# Patient Record
Sex: MALE | Race: WHITE | HISPANIC OR LATINO | Employment: UNEMPLOYED | URBAN - METROPOLITAN AREA
[De-identification: names, ages, dates, MRNs, and addresses within clinical notes are randomized per-mention and may not be internally consistent; named-entity substitution may affect disease eponyms.]

---

## 2018-02-11 ENCOUNTER — HOSPITAL ENCOUNTER (EMERGENCY)
Facility: MEDICAL CENTER | Age: 38
End: 2018-02-11
Attending: EMERGENCY MEDICINE
Payer: MEDICAID

## 2018-02-11 VITALS
RESPIRATION RATE: 15 BRPM | HEIGHT: 74 IN | OXYGEN SATURATION: 94 % | SYSTOLIC BLOOD PRESSURE: 120 MMHG | HEART RATE: 64 BPM | DIASTOLIC BLOOD PRESSURE: 62 MMHG | WEIGHT: 200 LBS | TEMPERATURE: 96.1 F | BODY MASS INDEX: 25.67 KG/M2

## 2018-02-11 DIAGNOSIS — H10.30 ACUTE BACTERIAL CONJUNCTIVITIS, UNSPECIFIED LATERALITY: ICD-10-CM

## 2018-02-11 PROCEDURE — 99283 EMERGENCY DEPT VISIT LOW MDM: CPT

## 2018-02-11 RX ORDER — OFLOXACIN 3 MG/ML
1 SOLUTION/ DROPS OPHTHALMIC 4 TIMES DAILY
Qty: 1 BOTTLE | Refills: 1 | Status: SHIPPED | OUTPATIENT
Start: 2018-02-11 | End: 2018-02-17

## 2018-02-11 ASSESSMENT — PAIN SCALES - GENERAL: PAINLEVEL_OUTOF10: 10

## 2018-02-11 NOTE — ED PROVIDER NOTES
"ED Provider Note    CHIEF COMPLAINT  Chief Complaint   Patient presents with   • Conjunctivitis       HPI  David Lewis is a 37 y.o. male who presents for evaluation of bilateral right greater than left eye redness and drainage. The patient wears contact lenses but she wears day and night he has not taken them out or clean them for over a year. He does not have an eye doctor. He reports significant decrease in vision in the right eye with pain and drainage. He has no significant medical or surgical history. No history of high surgery in the past. He does not have an ophthalmologist    REVIEW OF SYSTEMS  See HPI for further details. No high fevers chills night has weight loss numbness tingling or weakness All other systems are negative.     PAST MEDICAL HISTORY  No past medical history on file.  No stated medical history  FAMILY HISTORY  Noncontributory    SOCIAL HISTORY  Social History     Social History   • Marital status: Single     Spouse name: N/A   • Number of children: N/A   • Years of education: N/A     Social History Main Topics   • Smoking status: Never Smoker   • Smokeless tobacco: Not on file   • Alcohol use Yes   • Drug use: Yes     Types: Inhaled   • Sexual activity: Not on file     Other Topics Concern   • Not on file     Social History Narrative   • No narrative on file   Denies IV drugs     SURGICAL HISTORY  No past surgical history on file.  No major surgeries  CURRENT MEDICATIONS  No regular meds    ALLERGIES  No Known Allergies    PHYSICAL EXAM  VITAL SIGNS: /64   Pulse 62   Temp (!) 35.6 °C (96.1 °F) (Temporal)   Resp 14   Ht 1.88 m (6' 2\")   Wt 90.7 kg (200 lb)   SpO2 93%   BMI 25.68 kg/m²       Constitutional: Well developed, Well nourished, No acute distress, Non-toxic appearance.   HENT: Normocephalic, Atraumatic, Bilateral external ears normal, Oropharynx moist, No oral exudates, Nose normal.   Eyes: PERRLA, pupils are 3-2 bilaterally. The patient does have consensual pain " with shining a light in the left eye in the right eye. He has bilateral profound conjunctival hyperemia with exudates..   Neck: Normal range of motion, No tenderness, Supple, No stridor.   Cardiovascular: Normal heart rate, Normal rhythm, No murmurs, No rubs, No gallops.   Thorax & Lungs: Normal breath sounds, No respiratory distress, No wheezing, No chest tenderness.   Abdomen: Bowel sounds normal, Soft, No tenderness, No masses, No pulsatile masses.   Skin: Warm, Dry, No erythema, No rash.   Extremities: Intact distal pulses, No edema, No tenderness, No cyanosis, No clubbing.   Musculoskeletal: Good range of motion in all major joints. No tenderness to palpation or major deformities noted.   Neurologic: Alert & oriented x 3, Normal motor function, Normal sensory function, No focal deficits noted.   Psychiatric: Anxious.       COURSE & MEDICAL DECISION MAKING  Pertinent Labs & Imaging studies reviewed. (See chart for details)  Visual acuity right 20/200, left 20/40, both 20/30    Slit-lamp exam was performed. Proparacaine was instilled into both eyes Fluoresceine was instilled in both eyes. There is some subtle uptake on the right side suggestive of either abrasion or possible ulcer no dendritic lesions. Primo-Pen was used to check the pressure in the right eye was 4, 4 and 3, left eye was 2 and 3    Patient here has rather profound conjunctivitis. I had him remove his contact lens in the left eye he already removed it in the right. Apparently he had the same contact lenses in his eyes for over a year never took them out never changed him. The patient does have some visual acuity deficit but in light of his significant irritation should likely resolve. He'll need urgent follow-up and will be referred to ophthalmology with Dr. Street. I counseled him on not using any contact lenses while he is having symptoms. I will provide some erythromycin ophthalmic ointment but he clearly needs for quinolone treatment and he  will be given a prescription of Ocuflox    FINAL IMPRESSION  1.  1. Acute bacterial conjunctivitis, unspecified laterality               Electronically signed by: Hola Schaefer, 2/11/2018 2:41 PM

## 2018-02-11 NOTE — DISCHARGE INSTRUCTIONS
"Conjunctivitis  Conjunctivitis is commonly called \"pink eye.\" Conjunctivitis can be caused by bacterial or viral infection, allergies, or injuries. There is usually redness of the lining of the eye, itching, discomfort, and sometimes discharge. There may be deposits of matter along the eyelids. A viral infection usually causes a watery discharge, while a bacterial infection causes a yellowish, thick discharge. Pink eye is very contagious and spreads by direct contact.  You may be given antibiotic eyedrops as part of your treatment. Before using your eye medicine, remove all drainage from the eye by washing gently with warm water and cotton balls. Continue to use the medication until you have awakened 2 mornings in a row without discharge from the eye. Do not rub your eye. This increases the irritation and helps spread infection. Use separate towels from other household members. Wash your hands with soap and water before and after touching your eyes. Use cold compresses to reduce pain and sunglasses to relieve irritation from light. Do not wear contact lenses or wear eye makeup until the infection is gone.  SEEK MEDICAL CARE IF:   · Your symptoms are not better after 3 days of treatment.  · You have increased pain or trouble seeing.  · The outer eyelids become very red or swollen.  Document Released: 01/25/2006 Document Revised: 03/11/2013 Document Reviewed: 12/18/2006  [employer]® Patient Information ©2014 [employer].    "

## 2018-02-11 NOTE — ED NOTES
Pt c/o drainage/swelling/redness to R eye since yesterday. Pt states he kept his contacts in for approx. 1 year. L eye also appears red with mild swelling.     A/o x4, speaking in full sentences.

## 2018-02-17 ENCOUNTER — HOSPITAL ENCOUNTER (EMERGENCY)
Facility: MEDICAL CENTER | Age: 38
End: 2018-02-17
Attending: EMERGENCY MEDICINE
Payer: MEDICAID

## 2018-02-17 VITALS
SYSTOLIC BLOOD PRESSURE: 125 MMHG | RESPIRATION RATE: 18 BRPM | DIASTOLIC BLOOD PRESSURE: 67 MMHG | HEIGHT: 74 IN | HEART RATE: 80 BPM | OXYGEN SATURATION: 98 % | BODY MASS INDEX: 23.1 KG/M2 | WEIGHT: 180 LBS | TEMPERATURE: 97.4 F

## 2018-02-17 DIAGNOSIS — L02.91 ABSCESS: ICD-10-CM

## 2018-02-17 PROCEDURE — 700101 HCHG RX REV CODE 250

## 2018-02-17 PROCEDURE — 303977 HCHG I & D

## 2018-02-17 PROCEDURE — 99283 EMERGENCY DEPT VISIT LOW MDM: CPT

## 2018-02-17 RX ORDER — LIDOCAINE HCL/EPINEPHRINE/PF 2%-1:200K
10 VIAL (ML) INJECTION ONCE
Status: COMPLETED | OUTPATIENT
Start: 2018-02-17 | End: 2018-02-17

## 2018-02-17 RX ORDER — LIDOCAINE HCL/EPINEPHRINE/PF 2%-1:200K
VIAL (ML) INJECTION
Status: COMPLETED
Start: 2018-02-17 | End: 2018-02-17

## 2018-02-17 RX ADMIN — Medication 10 ML: at 12:24

## 2018-02-17 RX ADMIN — LIDOCAINE HYDROCHLORIDE,EPINEPHRINE BITARTRATE 10 ML: 20; .005 INJECTION, SOLUTION EPIDURAL; INFILTRATION; INTRACAUDAL; PERINEURAL at 12:24

## 2018-02-17 ASSESSMENT — PAIN SCALES - GENERAL: PAINLEVEL_OUTOF10: 6

## 2018-02-17 NOTE — ED PROVIDER NOTES
"ED Provider Note    Scribed for Shaheed Gutierrez M.D. by Jose Cole. 2/17/2018  12:19 PM    Means of arrival: Private vehicle  History obtained from: Patient  History limited by: None    CHIEF COMPLAINT  Chief Complaint   Patient presents with   • Abscess       HPI  David Lewis is a 37 y.o. male who presents to the Emergency Department complaining of a abscess on his back that he first noticed two to three days ago. Patient noted some drainage from the abscess. He experienced a similar abscess in the past, but it was located farther down on his back. He denies fever, chills, or sweats. Patient states that his girlfriend likely had a MRSA infection and she is a heroin user. The patient has no known drug allergies. The patient is a .    REVIEW OF SYSTEMS  Pertinent negatives include no fever, chills, or sweats.    E    PAST MEDICAL HISTORY   Abscess.    SURGICAL HISTORY  patient denies any surgical history    SOCIAL HISTORY  Social History   Substance Use Topics   • Smoking status: Never Smoker   • Smokeless tobacco: Never Used   • Alcohol use Yes      History   Drug Use   • Types: Inhaled       FAMILY HISTORY  None noted.    CURRENT MEDICATIONS  No current facility-administered medications on file prior to encounter.      Current Outpatient Prescriptions on File Prior to Encounter   Medication Sig Dispense Refill   • ofloxacin (OCUFLOX) 0.3 % Solution Place 1 Drop in both eyes 4 times a day. 1 Bottle 1   • hydrocodone-acetaminophen (NORCO) 5-325 MG TABS per tablet Take 1-2 Tabs by mouth every four hours as needed. 15 Tab 0       ALLERGIES  No Known Allergies    PHYSICAL EXAM  VITAL SIGNS: /63   Pulse 78   Temp 36.3 °C (97.4 °F) (Temporal)   Resp 16   Ht 1.88 m (6' 2\")   Wt 81.6 kg (180 lb)   SpO2 98%   BMI 23.11 kg/m²   Constitutional: Well developed, Well nourished, No acute distress, Non-toxic appearance.   Skin: Pea-size abscess to right posterior midline chest wall. With evidence " of recent drainage, but no active drainage and minimal surrounding erythema.  Back:   Genitalia:   Rectal:   Extremities:   Musculoskeletal:   Neurologic:   Psychiatric:         PROCEDURES  Incision and Drainage Procedure    Indication: Abscess    Location: right lower posterior midline chest wall    Procedure: The patient was positioned appropriately andlocal anesthesia was obtained by infiltration using 2% Lidocaine with epinephrine.  An incision was then made over the apex of the lesion and approximately 0,5 cc of purulent material was expressed. Loculations were broken up using a cotton q-tip and more of the material was able to be expressed. The drainage cavity was then packed with sterile gauze. The patient’s tetanus status was up to date and did not require a booster dose.    The patient tolerated the procedure well.    Complications: None    COURSE & MEDICAL DECISION MAKING  Nursing notes, VS, PMSFHx reviewed in chart.    12:19 PM Patient seen and examined at bedside. I informed the patient that he will require an incision and drainage. Patient verbalizes understanding and agreement. Patient was treated with lidocaine-epinephrine 2% [id number] injection 10 mg for his symptoms.      12:25 PM - I performed an incision and drainage at this time, as above. We discussed proper home management of his wound and packing including removal the packing at home in several days. I discussed plans for discharge but given that this is a simple abscess that does not require antibiotics . Patient is counseled on frequent handwashing and general surface decontamination in his home.     He will have packing removed in 2 days' time return if problems develop. The patient will return for new or worsening symptoms and is stable at the time of discharge.    DISPOSITION:  Patient will be discharged home in stable condition.    FINAL IMPRESSION  1. Abscess    2.      Incision and drainage performed by REY, as above.     Jose MARK  Douglas (Scribe), am scribing for, and in the presence of, Shaheed Gutierrez M.D..    Electronically signed by: Jose Cole (Scribe), 2/17/2018    IShaheed M.D. personally performed the services described in this documentation, as scribed by Jose Cole in my presence, and it is both accurate and complete.    The note accurately reflects work and decisions made by me.  Shaheed Gutierrez  2/17/2018  12:37 PM

## 2018-02-17 NOTE — DISCHARGE INSTRUCTIONS
Incision and Drainage  Incision and drainage is a procedure in which a sac-like structure (cystic structure) is opened and drained. The area to be drained usually contains material such as pus, fluid, or blood.   LET YOUR CAREGIVER KNOW ABOUT:   · Allergies to medicine.  · Medicines taken, including vitamins, herbs, eyedrops, over-the-counter medicines, and creams.  · Use of steroids (by mouth or creams).  · Previous problems with anesthetics or numbing medicines.  · History of bleeding problems or blood clots.  · Previous surgery.  · Other health problems, including diabetes and kidney problems.  · Possibility of pregnancy, if this applies.  RISKS AND COMPLICATIONS  · Pain.  · Bleeding.  · Scarring.  · Infection.  BEFORE THE PROCEDURE   You may need to have an ultrasound or other imaging tests to see how large or deep your cystic structure is. Blood tests may also be used to determine if you have an infection or how severe the infection is. You may need to have a tetanus shot.  PROCEDURE   The affected area is cleaned with a cleaning fluid. The cyst area will then be numbed with a medicine (local anesthetic). A small incision will be made in the cystic structure. A syringe or catheter may be used to drain the contents of the cystic structure, or the contents may be squeezed out. The area will then be flushed with a cleansing solution. After cleansing the area, it is often gently packed with a gauze or another wound dressing. Once it is packed, it will be covered with gauze and tape or some other type of wound dressing.   AFTER THE PROCEDURE   · Often, you will be allowed to go home right after the procedure.  · You may be given antibiotic medicine to prevent or heal an infection.  · If the area was packed with gauze or some other wound dressing, you will likely need to come back in 1 to 2 days to get it removed.  · The area should heal in about 14 days.     This information is not intended to replace advice given  to you by your health care provider. Make sure you discuss any questions you have with your health care provider.     Document Released: 06/13/2002 Document Revised: 06/18/2013 Document Reviewed: 02/11/2013  [employer] Interactive Patient Education ©2016 [employer]    Abscess  Care After  An abscess (also called a boil or furuncle) is an infected area that contains a collection of pus. Signs and symptoms of an abscess include pain, tenderness, redness, or hardness, or you may feel a moveable soft area under your skin. An abscess can occur anywhere in the body. The infection may spread to surrounding tissues causing cellulitis. A cut (incision) by the surgeon was made over your abscess and the pus was drained out. Gauze may have been packed into the space to provide a drain that will allow the cavity to heal from the inside outwards. The boil may be painful for 5 to 7 days. Most people with a boil do not have high fevers. Your abscess, if seen early, may not have localized, and may not have been lanced. If not, another appointment may be required for this if it does not get better on its own or with medications.  HOME CARE INSTRUCTIONS   · Only take over-the-counter or prescription medicines for pain, discomfort, or fever as directed by your caregiver.  · When you bathe, soak and then remove gauze or iodoform packs at least daily or as directed by your caregiver. You may then wash the wound gently with mild soapy water. Repack with gauze or do as your caregiver directs.  SEEK IMMEDIATE MEDICAL CARE IF:   · You develop increased pain, swelling, redness, drainage, or bleeding in the wound site.  · You develop signs of generalized infection including muscle aches, chills, fever, or a general ill feeling.  · An oral temperature above 102° F (38.9° C) develops, not controlled by medication.  See your caregiver for a recheck if you develop any of the symptoms described above. If medications (antibiotics) were prescribed,  take them as directed.  Document Released: 07/06/2006 Document Revised: 03/11/2013 Document Reviewed: 03/02/2009  [employer]® Patient Information ©2014 [employer]

## 2020-08-23 ENCOUNTER — APPOINTMENT (OUTPATIENT)
Dept: RADIOLOGY | Facility: MEDICAL CENTER | Age: 40
End: 2020-08-23
Attending: EMERGENCY MEDICINE
Payer: MEDICAID

## 2020-08-23 ENCOUNTER — HOSPITAL ENCOUNTER (EMERGENCY)
Facility: MEDICAL CENTER | Age: 40
End: 2020-08-23
Attending: EMERGENCY MEDICINE
Payer: MEDICAID

## 2020-08-23 VITALS
SYSTOLIC BLOOD PRESSURE: 124 MMHG | OXYGEN SATURATION: 99 % | DIASTOLIC BLOOD PRESSURE: 73 MMHG | RESPIRATION RATE: 16 BRPM | WEIGHT: 195.11 LBS | HEART RATE: 105 BPM | BODY MASS INDEX: 25.04 KG/M2 | HEIGHT: 74 IN | TEMPERATURE: 97.9 F

## 2020-08-23 DIAGNOSIS — N50.82 SCROTAL PAIN: ICD-10-CM

## 2020-08-23 DIAGNOSIS — Z20.2 STD EXPOSURE: ICD-10-CM

## 2020-08-23 DIAGNOSIS — R30.0 DYSURIA: ICD-10-CM

## 2020-08-23 PROCEDURE — 99284 EMERGENCY DEPT VISIT MOD MDM: CPT

## 2020-08-23 PROCEDURE — A9270 NON-COVERED ITEM OR SERVICE: HCPCS | Performed by: EMERGENCY MEDICINE

## 2020-08-23 PROCEDURE — 96372 THER/PROPH/DIAG INJ SC/IM: CPT

## 2020-08-23 PROCEDURE — 700111 HCHG RX REV CODE 636 W/ 250 OVERRIDE (IP): Performed by: EMERGENCY MEDICINE

## 2020-08-23 PROCEDURE — 700102 HCHG RX REV CODE 250 W/ 637 OVERRIDE(OP): Performed by: EMERGENCY MEDICINE

## 2020-08-23 PROCEDURE — 93975 VASCULAR STUDY: CPT

## 2020-08-23 RX ORDER — AZITHROMYCIN 250 MG/1
1000 TABLET, FILM COATED ORAL ONCE
Status: COMPLETED | OUTPATIENT
Start: 2020-08-23 | End: 2020-08-23

## 2020-08-23 RX ORDER — CEFTRIAXONE SODIUM 250 MG/1
250 INJECTION, POWDER, FOR SOLUTION INTRAMUSCULAR; INTRAVENOUS ONCE
Status: COMPLETED | OUTPATIENT
Start: 2020-08-23 | End: 2020-08-23

## 2020-08-23 RX ADMIN — AZITHROMYCIN MONOHYDRATE 1000 MG: 250 TABLET ORAL at 18:22

## 2020-08-23 RX ADMIN — CEFTRIAXONE SODIUM 250 MG: 250 INJECTION, POWDER, FOR SOLUTION INTRAMUSCULAR; INTRAVENOUS at 18:21

## 2020-08-23 NOTE — ED TRIAGE NOTES
".  Chief Complaint   Patient presents with   • Testicle Swelling     right   • Exposure to STD     reports exposure to chlamydia 1 week ago     ./73   Pulse (!) 105   Temp 36.6 °C (97.9 °F) (Temporal)   Resp 16   Ht 1.88 m (6' 2\")   Wt 88.5 kg (195 lb 1.7 oz)   SpO2 99%   BMI 25.05 kg/m²     Ambulatory to triage with above complaints, educated on triage process, placed in lobby, told to inform staff of any changes in condition.    "

## 2020-08-24 NOTE — ED NOTES
Still unable to provide urine sample despite drinking water.  Does not feel urge to void.  Encouraged to try to provide small sample asap

## 2020-08-24 NOTE — ED PROVIDER NOTES
"ED Provider Note    CHIEF COMPLAINT  Chief Complaint   Patient presents with   • Testicle Swelling     right   • Exposure to STD     reports exposure to chlamydia 1 week ago        HPI    Primary care provider: Pcp Pt States None   History obtained from: Patient  History limited by: None     David Lewis is a 39 y.o. male who presents to the ED complaining of right scrotal pain and swelling for the past 2 days without injury/trauma.  Patient states that he had unprotected sex and started having burning with urination that started about 12 days ago.  He also noticed slight white milky discharge.  He reports similar symptoms due to chlamydia in the past and was subsequently treated.  He denies history of other sexually transmitted infections.  He denies fever/chills/nausea/vomiting/diarrhea/constipation.  He has not noticed any rash.  He denies pain anywhere else.  He denies shortness of breath or difficulty breathing.    REVIEW OF SYSTEMS  Please see HPI for pertinent positives/negatives.  All other systems reviewed and are negative.     PAST MEDICAL HISTORY  No past medical history on file.     SURGICAL HISTORY  History reviewed. No pertinent surgical history.     SOCIAL HISTORY  Social History     Tobacco Use   • Smoking status: Never Smoker   • Smokeless tobacco: Never Used   Substance and Sexual Activity   • Alcohol use: Yes   • Drug use: Yes     Types: Inhaled     Comment: \"I smoke a lot of pot\"   • Sexual activity: Not on file        FAMILY HISTORY  History reviewed. No pertinent family history.     CURRENT MEDICATIONS  Home Medications     Reviewed by Kait Ching R.N. (Registered Nurse) on 08/23/20 at 1630  Med List Status: Complete   Medication Last Dose Status        Patient Rocco Taking any Medications                        ALLERGIES  No Known Allergies     PHYSICAL EXAM  VITAL SIGNS: /73   Pulse (!) 105   Temp 36.6 °C (97.9 °F) (Temporal)   Resp 16   Ht 1.88 m (6' 2\")   Wt 88.5 " kg (195 lb 1.7 oz)   SpO2 99%   BMI 25.05 kg/m²  @[id number]::@     Pulse ox interpretation: 99% I interpret this pulse ox as normal     Constitutional: Well developed, well nourished, alert in no apparent distress, nontoxic appearance    HENT: No external signs of trauma, normocephalic, oropharynx moist and clear, nose normal    Eyes: PERRL, conjunctiva without erythema, no discharge, no icterus    Neck: Soft and supple, trachea midline, no stridor, no tenderness, no LAD, no JVD, good ROM    Cardiovascular: Regular rate and rhythm, no murmurs/rubs/gallops, strong distal pulses and good perfusion    Thorax & Lungs: No respiratory distress, CTAB   Abdomen: Soft, nontender, nondistended, no guarding, no rebound, normal BS    : NEMG, uncircumcised, testis descended bilaterally and nontender on the left, mild tenderness on the right, no hernia/rash/lesions/discharge/LAD    Back: No CVAT    Extremities: No cyanosis, no edema, no gross deformity, good ROM, intact distal pulses with brisk cap refill    Skin: Warm, dry, no pallor/cyanosis, no rash noted    Lymphatic: No lymphadenopathy noted    Neuro: A/O times 3, no focal deficits noted    Psychiatric: Cooperative, normal mood and affect      DIAGNOSTIC STUDIES / PROCEDURES        LABS  All labs reviewed by me.     Results for orders placed or performed during the hospital encounter of 08/20/13   URINALYSIS    Specimen: Urine   Result Value Ref Range    Color Yellow     Character Clear     Specific Gravity 1.022 <1.035    Ph 7.0 5.0 - 8.0    Glucose Negative Negative mg/dL    Ketones Negative Negative mg/dL    Protein Negative Negative mg/dL    Bilirubin Negative Negative    Nitrite Negative Negative    Leukocyte Esterase Negative Negative    Occult Blood Negative Negative    Micro Urine Req see below    CBC WITH DIFFERENTIAL   Result Value Ref Range    WBC 11.0 (H) 4.8 - 10.8 K/uL    RBC 4.87 4.70 - 6.10 M/uL    Hemoglobin 15.1 14.0 - 18.0 g/dL    Hematocrit 45.6 42.0  - 52.0 %    MCV 93.7 79.0 - 98.0 fL    MCH 31.0 27.0 - 33.0 pg    MCHC 33.1 30.0 - 35.0 g/dL    RDW 13.7 12.0 - 16.2 %    Platelet Count 257 164 - 446 K/uL    MPV 8.7 6.7 - 10.4 fL    Neutrophils-Polys 85.0 (H) 44.0 - 72.0 %    Lymphocytes 10.3 (L) 22.0 - 41.0 %    Monocytes 3.9 1.0 - 9.0 %    Eosinophils 0.4 0.0 - 6.0 %    Basophils 0.4 0.0 - 2.0 %    Neutrophils (Absolute) 9.3 (H) 1.8 - 7.7 K/uL    Lymphs (Absolute) 1.1 1.0 - 4.8 K/uL   COMP METABOLIC PANEL   Result Value Ref Range    Sodium 137 135 - 145 mmol/L    Potassium 5.1 3.6 - 5.5 mmol/L    Chloride 104 96 - 112 mmol/L    Co2 29 20 - 33 mmol/L    Anion Gap 4.0 0.0 - 11.9    Glucose 203 (H) 65 - 99 mg/dL    Bun 20 8 - 22 mg/dL    Creatinine 1.17 0.50 - 1.40 mg/dL    Calcium 9.1 8.4 - 10.2 mg/dL    AST(SGOT) 17 12 - 45 U/L    ALT(SGPT) 17 2 - 50 U/L    Alkaline Phosphatase 55 30 - 99 U/L    Total Bilirubin 0.5 0.1 - 1.5 mg/dL    Albumin 4.2 3.2 - 4.9 g/dL    Total Protein 7.1 6.0 - 8.2 g/dL    Globulin 2.9 1.9 - 3.5 g/dL    A-G Ratio 1.4 g/dL   URINE CULTURE    Specimen: Urine   Result Value Ref Range    Source UR     Site      Urine Culture No growth at 48 hours     Significant Indicator NEG    EXTRA TUBE,SST   Result Value Ref Range    Extra Tube, SST Collected    EXTRA TUBE,RO   Result Value Ref Range    Extra Tube, Blue Collected    PERIPHERAL SMEAR REVIEW   Result Value Ref Range    Peripheral Smear Review see below    DIFFERENTIAL COMMENT   Result Value Ref Range    Comments-Diff see below    LEFT SHIFT   Result Value Ref Range    Left Shift 1+    RBC MORPHOLOGY   Result Value Ref Range    RBC Morphology Normal    ESTIMATED GFR   Result Value Ref Range    GFR If African American >60 mL/min/1.73 m 2    GFR If Non African American >60 mL/min/1.73 m 2   URINE DRUG SCREEN (TRIAGE)   Result Value Ref Range    Phencyclidine -Pcp Negative Negative    Benzodiazepines Negative Negative    Cocaine Metabolite Negative Negative    Amphetamines By Triage Positive  (A) Negative    Urine THC Positive (A) Negative    Codeine-Morphine Negative Negative    Barbiturates Negative Negative    Tricyclic Antidepressants Negative Negative   DIAGNOSTIC ALCOHOL   Result Value Ref Range    Diagnostic Alcohol 0.00 0.00 g/dL        RADIOLOGY  The radiologist's interpretation of all radiological studies have been reviewed by me.     US-DUPLEX TESTICLE COMPLETE (COMBO)   Final Result      No evidence of testicular torsion or mass.      Small hydroceles bilaterally.      There is prominence and hyperemia of the right spermatic cord.      Inguinal hernia is not excluded. No inguinal hernia was seen on prior CT.             COURSE & MEDICAL DECISION MAKING  Nursing notes, VS, PMSFHx reviewed in chart.     Review of past medical records shows the patient was last seen in this ED February 17, 2018 for abscess on the back.      Differential diagnoses considered include but are not limited to: UTI/cystitis/pyelo, orchitis/epididymitis, hydrocele, prostatitis, testicular torsion, hernia, KS/renal colic, urethritis, STI, cancer       History and physical exam as above.  Scrotal ultrasound with findings as above.  I was informed by ED nurse that patient eloped from the ED before he gave an urine sample.  He did receive empiric treatment with Rocephin and Zithromax for potential GC/chlamydia.  He was noted to be in no acute distress and nontoxic in appearance.  Low clinical suspicion for torsion, incarcerated hernia or sepsis.      The patient is referred to a primary physician for blood pressure management, diabetic screening, and for all other preventative health concerns.       FINAL IMPRESSION  1. Scrotal pain Acute   2. Dysuria Acute   3. STD exposure Acute          DISPOSITION  Patient eloped from the ED      FOLLOW UP  No follow-up provider specified.       OUTPATIENT MEDICATIONS  There are no discharge medications for this patient.         Electronically signed by: Charlie Dobbins D.O., 8/23/2020  5:07 PM      Portions of this record were made with voice recognition software.  Despite my review, spelling/grammar/context errors may still remain.  Interpretation of this chart should be taken in this context.

## 2022-12-04 ENCOUNTER — HOSPITAL ENCOUNTER (EMERGENCY)
Facility: MEDICAL CENTER | Age: 42
End: 2022-12-04
Attending: EMERGENCY MEDICINE
Payer: MEDICAID

## 2022-12-04 VITALS
TEMPERATURE: 98.9 F | HEART RATE: 88 BPM | DIASTOLIC BLOOD PRESSURE: 76 MMHG | BODY MASS INDEX: 25.8 KG/M2 | RESPIRATION RATE: 18 BRPM | HEIGHT: 74 IN | WEIGHT: 201.06 LBS | SYSTOLIC BLOOD PRESSURE: 131 MMHG | OXYGEN SATURATION: 95 %

## 2022-12-04 DIAGNOSIS — L73.9 FOLLICULITIS: ICD-10-CM

## 2022-12-04 PROCEDURE — RXMED WILLOW AMBULATORY MEDICATION CHARGE: Performed by: EMERGENCY MEDICINE

## 2022-12-04 PROCEDURE — A9270 NON-COVERED ITEM OR SERVICE: HCPCS | Performed by: EMERGENCY MEDICINE

## 2022-12-04 PROCEDURE — 700102 HCHG RX REV CODE 250 W/ 637 OVERRIDE(OP): Performed by: EMERGENCY MEDICINE

## 2022-12-04 PROCEDURE — 99283 EMERGENCY DEPT VISIT LOW MDM: CPT

## 2022-12-04 RX ORDER — DOXYCYCLINE 100 MG/1
100 CAPSULE ORAL 2 TIMES DAILY
Qty: 20 CAPSULE | Refills: 0 | Status: SHIPPED | OUTPATIENT
Start: 2022-12-04 | End: 2022-12-16

## 2022-12-04 RX ORDER — DOXYCYCLINE 100 MG/1
100 TABLET ORAL ONCE
Status: COMPLETED | OUTPATIENT
Start: 2022-12-04 | End: 2022-12-04

## 2022-12-04 RX ORDER — CETIRIZINE HYDROCHLORIDE 10 MG/1
10 TABLET ORAL DAILY
Qty: 30 TABLET | Refills: 0 | Status: SHIPPED | OUTPATIENT
Start: 2022-12-04

## 2022-12-04 RX ADMIN — DOXYCYCLINE 100 MG: 100 TABLET, FILM COATED ORAL at 11:51

## 2022-12-04 NOTE — ED TRIAGE NOTES
"Chief Complaint   Patient presents with    Abscess     Patient has multiple abscess' on his entire buttocks for past 2 weeks. He is homeless but no bugs noted on patient. States that they are painful. Denies IV drug use.        Patient to triage ambulatory with a steady gait, AAOx4, Appropriate precautions in place.     Explained wait time and triage process. Placed back in lobby. Told to notify ED tech or RN of any changes, verbalized understanding.    /84   Pulse 86   Temp 37.4 °C (99.3 °F) (Temporal)   Resp 18   Ht 1.88 m (6' 2\")   Wt 91.2 kg (201 lb 1 oz)   SpO2 94%   BMI 25.81 kg/m²     "

## 2022-12-04 NOTE — ED NOTES
Pt provided with discharge instructions. Pt had no further questions. Pt ambulated to [facility]

## 2022-12-04 NOTE — ED PROVIDER NOTES
"ED Provider Note    ED Provider Note    Primary care provider: Pcp Pt States None  Means of arrival: Walk-in  History obtained from: Patient    CHIEF COMPLAINT  Chief Complaint   Patient presents with    Abscess     Patient has multiple abscess' on his entire buttocks for past 2 weeks. He is homeless but no bugs noted on patient. States that they are painful. Denies IV drug use.      Seen at 11:19 AM.   HPI  David Lewis is a 42 y.o. male who presents to the Emergency Department multiple painful pruritic lesions on the buttocks for the past few weeks.  The patient has never had this before.  He denies any new lotions or detergents.  Denies sitting sitting on any standing water.  Denies any history of diabetes, eczema or recurrent cellulitis.  No history of IVDU.    REVIEW OF SYSTEMS  See HPI,   Remainder of ROS negative.     PAST MEDICAL HISTORY  Denies    SURGICAL HISTORY  patient denies any surgical history    SOCIAL HISTORY  Social History     Tobacco Use    Smoking status: Never    Smokeless tobacco: Never   Substance Use Topics    Alcohol use: Yes    Drug use: Yes     Types: Inhaled     Comment: \"I smoke a lot of pot\"      Social History     Substance and Sexual Activity   Drug Use Yes    Types: Inhaled    Comment: \"I smoke a lot of pot\"       FAMILY HISTORY  History reviewed. No pertinent family history.    CURRENT MEDICATIONS  Reviewed.  See Encounter Summary.     ALLERGIES  No Known Allergies    PHYSICAL EXAM  VITAL SIGNS: /76   Pulse 88   Temp 37.2 °C (98.9 °F) (Temporal)   Resp 18   Ht 1.88 m (6' 2\")   Wt 91.2 kg (201 lb 1 oz)   SpO2 95%   BMI 25.81 kg/m²   Constitutional: Awake, alert in no apparent distress.  HENT: Normocephalic, Bilateral external ears normal. Nose normal.  Normal posterior pharynx.  Eyes: Conjunctiva normal, non-icteric, EOMI.    Thorax & Lungs: Easy unlabored respirations, Clear to ascultation bilaterally.  Cardiovascular: Regular rate, Regular rhythm, No " murmurs, rubs or gallops. Bilateral pulses symmetrical.   Abdomen:  Soft, nontender, nondistended, normal active bowel sounds.   :    Skin: No rash noted on the chest, back, face, extremities.  On the buttock there are numerous pustular lesions, the largest about 2 cm, no areas of induration concerning for abscess.    Musculoskeletal:   No cyanosis, clubbing or edema. No leg asymmetry.   Neurologic: Alert, Grossly non-focal.   Psychiatric: Normal affect, Normal mood  Lymphatic:  No cervical LAD        RADIOLOGY  No orders to display         COURSE & MEDICAL DECISION MAKING  Pertinent Labs & Imaging studies reviewed. (See chart for details)    Differential diagnoses include but are not limited to: Folliculitis    11:19 AM - Medical record reviewed, patient seen and examined at bedside.    Decision Making:  This is a pleasant 42 y.o. year old male who presents with numerous pustular lesions on the buttock, all consistent with folliculitis.  Interestingly the patient does not have any lesions anywhere else.  This should respond very well to doxycycline, 10-day course is appropriate.  I will also give him some cetirizine as he has been scratching the area.  Recommend return for worsening swelling.  Today there is no areas that require I&D.        Discharge Medications:  Discharge Medication List as of 12/4/2022 11:37 AM        START taking these medications    Details   doxycycline (MONODOX) 100 MG capsule Take 1 Capsule by mouth 2 times a day for 10 days., Disp-20 Capsule, R-0, Normal      cetirizine (ZYRTEC) 10 MG Tab Take 1 Tablet by mouth every day., Disp-30 Tablet, R-0, Normal             The patient was discharged home (see d/c instructions) was told to return immediately for any signs or symptoms listed, or any worsening at all.  The patient verbally agreed to the discharge precautions and follow-up plan which is documented in EPIC.        FINAL IMPRESSION  1. Folliculitis

## 2022-12-06 ENCOUNTER — PHARMACY VISIT (OUTPATIENT)
Dept: PHARMACY | Facility: MEDICAL CENTER | Age: 42
End: 2022-12-06
Payer: COMMERCIAL

## 2023-09-07 ENCOUNTER — HOSPITAL ENCOUNTER (EMERGENCY)
Facility: MEDICAL CENTER | Age: 43
End: 2023-09-07
Attending: STUDENT IN AN ORGANIZED HEALTH CARE EDUCATION/TRAINING PROGRAM
Payer: MEDICAID

## 2023-09-07 VITALS
WEIGHT: 211.64 LBS | BODY MASS INDEX: 27.16 KG/M2 | TEMPERATURE: 97.3 F | RESPIRATION RATE: 15 BRPM | HEIGHT: 74 IN | SYSTOLIC BLOOD PRESSURE: 114 MMHG | DIASTOLIC BLOOD PRESSURE: 73 MMHG | HEART RATE: 82 BPM | OXYGEN SATURATION: 92 %

## 2023-09-07 DIAGNOSIS — L73.9 FOLLICULITIS: ICD-10-CM

## 2023-09-07 PROCEDURE — A9270 NON-COVERED ITEM OR SERVICE: HCPCS | Mod: UD | Performed by: STUDENT IN AN ORGANIZED HEALTH CARE EDUCATION/TRAINING PROGRAM

## 2023-09-07 PROCEDURE — 99283 EMERGENCY DEPT VISIT LOW MDM: CPT

## 2023-09-07 PROCEDURE — 700102 HCHG RX REV CODE 250 W/ 637 OVERRIDE(OP): Mod: UD | Performed by: STUDENT IN AN ORGANIZED HEALTH CARE EDUCATION/TRAINING PROGRAM

## 2023-09-07 RX ORDER — DOXYCYCLINE 100 MG/1
100 CAPSULE ORAL 2 TIMES DAILY
Qty: 10 CAPSULE | Refills: 0 | Status: ACTIVE | OUTPATIENT
Start: 2023-09-07 | End: 2023-09-12

## 2023-09-07 RX ORDER — DOXYCYCLINE 100 MG/1
100 TABLET ORAL ONCE
Status: COMPLETED | OUTPATIENT
Start: 2023-09-07 | End: 2023-09-07

## 2023-09-07 RX ADMIN — DOXYCYCLINE 100 MG: 100 TABLET, FILM COATED ORAL at 03:39

## 2023-09-07 NOTE — ED PROVIDER NOTES
"CHIEF COMPLAINT  Chief Complaint   Patient presents with    Skin Lesion     Pt has \"boils\" to buttocks and thigh. Lesions are red and painful. Pt noticed lesions x 2 weeks. States hx of same with relief from abx       LIMITATION TO HISTORY   Select: None    HPI    David Lewis is a 42 y.o. male who presents to the Emergency Department evaluation of multiple pruritic painful lesions on his buttock for the past 2 weeks.  Has had no fevers, no purulent drainage, no known allergen exposures.  Denies any fevers, diabetes, history of cellulitis, or IV drug use    OUTSIDE HISTORIAN(S):  Select: None    EXTERNAL RECORDS REVIEWED  Select: Other ED visit 12/4/2022 evaluated for folliculitis      PAST MEDICAL HISTORY  History reviewed. No pertinent past medical history.  .    SURGICAL HISTORY  History reviewed. No pertinent surgical history.      FAMILY HISTORY  No family history on file.       SOCIAL HISTORY  Social History     Socioeconomic History    Marital status: Single     Spouse name: Not on file    Number of children: Not on file    Years of education: Not on file    Highest education level: Not on file   Occupational History    Not on file   Tobacco Use    Smoking status: Never    Smokeless tobacco: Never   Substance and Sexual Activity    Alcohol use: Yes    Drug use: Yes     Types: Inhaled     Comment: \"I smoke a lot of pot\"    Sexual activity: Not on file   Other Topics Concern    Not on file   Social History Narrative    Not on file     Social Determinants of Health     Financial Resource Strain: Not on file   Food Insecurity: Not on file   Transportation Needs: Not on file   Physical Activity: Not on file   Stress: Not on file   Social Connections: Not on file   Intimate Partner Violence: Not on file   Housing Stability: Not on file         CURRENT MEDICATIONS  No current facility-administered medications on file prior to encounter.     Current Outpatient Medications on File Prior to Encounter " "  Medication Sig Dispense Refill    cetirizine (ZYRTEC) 10 MG Tab Take 1 Tablet by mouth every day. 30 Tablet 0           ALLERGIES  No Known Allergies    PHYSICAL EXAM  VITAL SIGNS:/73   Pulse 82   Temp 36.3 °C (97.3 °F) (Temporal)   Resp 15   Ht 1.88 m (6' 2\")   Wt 96 kg (211 lb 10.3 oz)   SpO2 92%   BMI 27.17 kg/m²     Pulse ox interpretation: I interpret this pulse ox as normal.  VITALS - vital signs documented prior to this note have been reviewed and noted,  see EHR  GENERAL - awake and alert, no acute distress  HEENT - normocephalic, atraumatic, moist mucus membranes  CARDIOVASCULAR - regular rate and rhythm  PULMONARY - unlabored, no respiratory distress. No audible wheezing or  stridor.  NEUROLOGIC - mental status normal, speech fluid, cognition normal  MUSCULOSKELETAL -no obvious deformity or swelling  DERMATOLOGIC - warm and dry, numerous small papular lesions on his buttock no surrounding erythema purulent drainage.  No bullous lesions skin sloughing mucosal involvement  PSYCHIATRIC - normal affect, normal concentration      DIAGNOSTIC STUDIES / PROCEDURES      Radiologist interpretation:   No orders to display        COURSE & MEDICAL DECISION MAKING    ED COURSE:    ED Observation Status? no    INTERVENTIONS BY ME:  Medications   doxycycline monohydrate (Adoxa) tablet 100 mg (100 mg Oral Given 9/7/23 0339)                     INITIAL ASSESSMENT, COURSE AND PLAN  Care Narrative:     Presented for evaluation of a rash on her buttock.  The rash does not have petechiae or purpura. There are no mucous membrane lesions, no signs of abscess, and no bullae. The patient appears well, has no fever, altered mental status or signs of systemic toxicity.  Doubt sepsis, [place] Spotted Fever, meningitis, meningococcemia, Lyme disease, toxic shock syndrome dress SJS.  There is no perineal rashes, doubt Chris's.  History of folliculitis in the region in the past which I do believe is the etiology " of his rash today thus he will be started on doxycycline.  Return precautions were discussed and he was discharged in stable condition           ADDITIONAL PROBLEM LIST    DISPOSITION AND DISCUSSIONS      Escalation of care considered, and ultimately not performed:blood analysis    Barriers to care at this time, including but not limited to: Patient does not have established PCP.     Decision tools and prescription drugs considered including, but not limited to: Antibiotics doxycycline .    FINAL DIAGNOSIS  1. Folliculitis             Electronically signed by: David Palma DO ,4:04 AM 09/07/23

## 2023-09-07 NOTE — ED TRIAGE NOTES
"Chief Complaint   Patient presents with    Skin Lesion     Pt has \"boils\" to buttocks and thigh. Lesions are red and painful. Pt noticed lesions x 2 weeks. States hx of same with relief from abx       Pt to triage with steady gait for above complaint. Presents with reddened lesions scattered on buttocks, thigh, shin, and chin. Pt first noticed lesions about 2 weeks PTA. Pt reports itchy and painful    Pt back to lob, educated on triage process and encourage to alert staff of any changes.     Vitals:    09/07/23 0056   BP: (!) 136/94   Pulse: (!) 102   Resp: 16   Temp: 36.2 °C (97.2 °F)   SpO2: 95%           "